# Patient Record
Sex: MALE | Race: ASIAN | NOT HISPANIC OR LATINO
[De-identification: names, ages, dates, MRNs, and addresses within clinical notes are randomized per-mention and may not be internally consistent; named-entity substitution may affect disease eponyms.]

---

## 2022-09-01 ENCOUNTER — APPOINTMENT (OUTPATIENT)
Dept: PLASTIC SURGERY | Facility: CLINIC | Age: 27
End: 2022-09-01

## 2022-09-23 ENCOUNTER — APPOINTMENT (OUTPATIENT)
Dept: PLASTIC SURGERY | Facility: CLINIC | Age: 27
End: 2022-09-23

## 2022-09-23 ENCOUNTER — TRANSCRIPTION ENCOUNTER (OUTPATIENT)
Age: 27
End: 2022-09-23

## 2022-09-23 DIAGNOSIS — Z86.59 PERSONAL HISTORY OF OTHER MENTAL AND BEHAVIORAL DISORDERS: ICD-10-CM

## 2022-09-23 PROCEDURE — 99204 OFFICE O/P NEW MOD 45 MIN: CPT

## 2022-09-23 RX ORDER — BUPROPION HYDROCHLORIDE 100 MG/1
TABLET, FILM COATED ORAL
Refills: 0 | Status: ACTIVE | COMMUNITY

## 2022-09-23 RX ORDER — ESTRADIOL 10 UG/1
TABLET, FILM COATED VAGINAL
Refills: 0 | Status: ACTIVE | COMMUNITY

## 2022-09-23 RX ORDER — SPIRONOLACTONE 50 MG/1
TABLET ORAL
Refills: 0 | Status: ACTIVE | COMMUNITY

## 2022-09-23 NOTE — ASSESSMENT
[FreeTextEntry1] : Pt was seen and examined together by LIS Cornelius and Dr. Kamron Tavera. Assessment and plan formulated and discussed at time of visit.

## 2022-09-23 NOTE — HISTORY OF PRESENT ILLNESS
[FreeTextEntry1] : JOHN AGUIRRE is a 27 year old male to female transgender patient with a history of gender dysphoria. She seeks consultation for facial feminization surgery and breast augmentation. She reports that she has been socially transitioning since June of 2020 . She has been medically transitioning since March of 2021. She takes estradiol orally and spironolactone. Her past medical history includes depression and anxiety for which she is managed on Wellbutrin.. She denies past surgical history. She is currently in transgender care at [facility]. She reports tobacco and THC use daily and alcohol occasionally. Denies other illicit drugs. Patient denies history of previous gender affirming surgeries and gender affirming procedures such as Botox, silicone, fillers, liposuction and fat grafting. Patient denies personal and family medical history of clots, strokes, bleeding disorders and anesthesia problems. She reports that the male appearance of herface/chest exacerbate her gender dysphoria and cause misgendering.

## 2022-10-11 ENCOUNTER — APPOINTMENT (OUTPATIENT)
Dept: PLASTIC SURGERY | Facility: CLINIC | Age: 27
End: 2022-10-11
Payer: COMMERCIAL

## 2022-10-11 PROCEDURE — XXXXX: CPT | Mod: 1L

## 2022-10-11 NOTE — HISTORY OF PRESENT ILLNESS
[FreeTextEntry1] : 27-year-old male to female transgender patient with a history of dysphoria to discuss upcoming facial feminization surgery and breast augmentation.  Patient has been socially transitioning since June 2020 and medically transitioning since March 2021.

## 2022-10-14 ENCOUNTER — APPOINTMENT (OUTPATIENT)
Dept: PLASTIC SURGERY | Facility: CLINIC | Age: 27
End: 2022-10-14

## 2022-12-02 ENCOUNTER — OUTPATIENT (OUTPATIENT)
Dept: OUTPATIENT SERVICES | Facility: HOSPITAL | Age: 27
LOS: 1 days | End: 2022-12-02

## 2022-12-02 ENCOUNTER — RESULT REVIEW (OUTPATIENT)
Age: 27
End: 2022-12-02

## 2022-12-02 ENCOUNTER — APPOINTMENT (OUTPATIENT)
Dept: CT IMAGING | Facility: CLINIC | Age: 27
End: 2022-12-02

## 2022-12-02 PROCEDURE — 70486 CT MAXILLOFACIAL W/O DYE: CPT | Mod: 26

## 2023-05-09 ENCOUNTER — TRANSCRIPTION ENCOUNTER (OUTPATIENT)
Age: 28
End: 2023-05-09

## 2023-05-09 VITALS
TEMPERATURE: 98 F | WEIGHT: 221.12 LBS | SYSTOLIC BLOOD PRESSURE: 122 MMHG | OXYGEN SATURATION: 96 % | RESPIRATION RATE: 61 BRPM | DIASTOLIC BLOOD PRESSURE: 69 MMHG | HEIGHT: 69 IN | HEART RATE: 73 BPM

## 2023-05-09 NOTE — PATIENT PROFILE ADULT - FALL HARM RISK - UNIVERSAL INTERVENTIONS
Bed in lowest position, wheels locked, appropriate side rails in place/Call bell, personal items and telephone in reach/Instruct patient to call for assistance before getting out of bed or chair/Non-slip footwear when patient is out of bed/[place] to call system/Physically safe environment - no spills, clutter or unnecessary equipment/Purposeful Proactive Rounding/Room/bathroom lighting operational, light cord in reach

## 2023-05-10 ENCOUNTER — INPATIENT (INPATIENT)
Facility: HOSPITAL | Age: 28
LOS: 0 days | Discharge: HOME | DRG: 876 | End: 2023-05-11
Attending: SURGERY | Admitting: SURGERY
Payer: MEDICAID

## 2023-05-10 ENCOUNTER — APPOINTMENT (OUTPATIENT)
Dept: PLASTIC SURGERY | Facility: HOSPITAL | Age: 28
End: 2023-05-10
Payer: COMMERCIAL

## 2023-05-10 DIAGNOSIS — Z98.890 OTHER SPECIFIED POSTPROCEDURAL STATES: Chronic | ICD-10-CM

## 2023-05-10 PROCEDURE — XXXXX: CPT | Mod: 1L

## 2023-05-10 PROCEDURE — 67950 REVISION OF EYELID: CPT

## 2023-05-10 PROCEDURE — 14040 TIS TRNFR F/C/C/M/N/A/G/H/F: CPT

## 2023-05-10 PROCEDURE — 15876 SUCTION LIPECTOMY HEAD&NECK: CPT

## 2023-05-10 DEVICE — IMP CUSTOM IPS TI 67MM: Type: IMPLANTABLE DEVICE | Status: FUNCTIONAL

## 2023-05-10 DEVICE — GUIDE CUTTING TRANSFORM XS: Type: IMPLANTABLE DEVICE | Status: FUNCTIONAL

## 2023-05-10 DEVICE — GUIDE MARKING CRANIAL: Type: IMPLANTABLE DEVICE | Status: FUNCTIONAL

## 2023-05-10 DEVICE — GUIDE CUTTING W/PLAN TRANSFORM: Type: IMPLANTABLE DEVICE | Status: FUNCTIONAL

## 2023-05-10 RX ORDER — DIAZEPAM 5 MG
5 TABLET ORAL EVERY 6 HOURS
Refills: 0 | Status: DISCONTINUED | OUTPATIENT
Start: 2023-05-10 | End: 2023-05-11

## 2023-05-10 RX ORDER — SODIUM CHLORIDE 9 MG/ML
1000 INJECTION, SOLUTION INTRAVENOUS
Refills: 0 | Status: DISCONTINUED | OUTPATIENT
Start: 2023-05-10 | End: 2023-05-11

## 2023-05-10 RX ORDER — ZOLPIDEM TARTRATE 10 MG/1
5 TABLET ORAL AT BEDTIME
Refills: 0 | Status: DISCONTINUED | OUTPATIENT
Start: 2023-05-10 | End: 2023-05-11

## 2023-05-10 RX ORDER — ACETAMINOPHEN 500 MG
1000 TABLET ORAL ONCE
Refills: 0 | Status: COMPLETED | OUTPATIENT
Start: 2023-05-10 | End: 2023-05-10

## 2023-05-10 RX ORDER — HYDROMORPHONE HYDROCHLORIDE 2 MG/ML
0.5 INJECTION INTRAMUSCULAR; INTRAVENOUS; SUBCUTANEOUS
Refills: 0 | Status: DISCONTINUED | OUTPATIENT
Start: 2023-05-10 | End: 2023-05-11

## 2023-05-10 RX ORDER — ONDANSETRON 8 MG/1
4 TABLET, FILM COATED ORAL EVERY 6 HOURS
Refills: 0 | Status: DISCONTINUED | OUTPATIENT
Start: 2023-05-10 | End: 2023-05-11

## 2023-05-10 RX ORDER — OXYCODONE HYDROCHLORIDE 5 MG/1
5 TABLET ORAL EVERY 4 HOURS
Refills: 0 | Status: DISCONTINUED | OUTPATIENT
Start: 2023-05-10 | End: 2023-05-11

## 2023-05-10 RX ORDER — CHLORHEXIDINE GLUCONATE 213 G/1000ML
15 SOLUTION TOPICAL
Refills: 0 | Status: DISCONTINUED | OUTPATIENT
Start: 2023-05-10 | End: 2023-05-11

## 2023-05-10 RX ORDER — DEXAMETHASONE 0.5 MG/5ML
10 ELIXIR ORAL EVERY 12 HOURS
Refills: 0 | Status: DISCONTINUED | OUTPATIENT
Start: 2023-05-10 | End: 2023-05-11

## 2023-05-10 RX ORDER — CEFAZOLIN SODIUM 1 G
2000 VIAL (EA) INJECTION EVERY 8 HOURS
Refills: 0 | Status: DISCONTINUED | OUTPATIENT
Start: 2023-05-10 | End: 2023-05-11

## 2023-05-10 RX ORDER — BENZOCAINE AND MENTHOL 5; 1 G/100ML; G/100ML
1 LIQUID ORAL EVERY 6 HOURS
Refills: 0 | Status: DISCONTINUED | OUTPATIENT
Start: 2023-05-10 | End: 2023-05-11

## 2023-05-10 RX ORDER — OXYCODONE HYDROCHLORIDE 5 MG/1
10 TABLET ORAL EVERY 4 HOURS
Refills: 0 | Status: DISCONTINUED | OUTPATIENT
Start: 2023-05-10 | End: 2023-05-11

## 2023-05-10 RX ORDER — SPIRONOLACTONE 25 MG/1
100 TABLET, FILM COATED ORAL
Refills: 0 | Status: DISCONTINUED | OUTPATIENT
Start: 2023-05-11 | End: 2023-05-11

## 2023-05-10 RX ORDER — ACETAMINOPHEN 500 MG
975 TABLET ORAL EVERY 6 HOURS
Refills: 0 | Status: DISCONTINUED | OUTPATIENT
Start: 2023-05-10 | End: 2023-05-11

## 2023-05-10 RX ORDER — CALCIUM CARBONATE 500(1250)
1 TABLET ORAL EVERY 6 HOURS
Refills: 0 | Status: DISCONTINUED | OUTPATIENT
Start: 2023-05-10 | End: 2023-05-11

## 2023-05-10 RX ADMIN — CHLORHEXIDINE GLUCONATE 15 MILLILITER(S): 213 SOLUTION TOPICAL at 19:04

## 2023-05-10 RX ADMIN — Medication 100 MILLIGRAM(S): at 14:16

## 2023-05-10 RX ADMIN — BENZOCAINE AND MENTHOL 1 LOZENGE: 5; 1 LIQUID ORAL at 18:39

## 2023-05-10 RX ADMIN — Medication 100 MILLIGRAM(S): at 21:37

## 2023-05-10 RX ADMIN — Medication 400 MILLIGRAM(S): at 19:39

## 2023-05-10 RX ADMIN — HYDROMORPHONE HYDROCHLORIDE 0.5 MILLIGRAM(S): 2 INJECTION INTRAMUSCULAR; INTRAVENOUS; SUBCUTANEOUS at 13:36

## 2023-05-10 RX ADMIN — SODIUM CHLORIDE 75 MILLILITER(S): 9 INJECTION, SOLUTION INTRAVENOUS at 13:26

## 2023-05-10 RX ADMIN — Medication 102 MILLIGRAM(S): at 18:39

## 2023-05-10 NOTE — BRIEF OPERATIVE NOTE - OPERATION/FINDINGS
FFS ( frontal sinus setback, lateral brow contouring, canthopexy, fat grafting to lip and cheeks, eyebrow raise, genioplasty, mandibular angles)

## 2023-05-10 NOTE — BRIEF OPERATIVE NOTE - NSICDXBRIEFPROCEDURE_GEN_ALL_CORE_FT
PROCEDURES:  Contouring, forehead 10-May-2023 11:50:32  Chepe Sanchez  Lateral canthopexy 10-May-2023 11:50:41  Chepe Sanchez  Open mandibular angle osteotomy 10-May-2023 11:50:48  Chepe Sanchez  Genioplasty 10-May-2023 11:50:54  Chepe Sanchez  Complete rhinoplasty 10-May-2023 11:51:04  Chepe Sanchez

## 2023-05-11 ENCOUNTER — TRANSCRIPTION ENCOUNTER (OUTPATIENT)
Age: 28
End: 2023-05-11

## 2023-05-11 VITALS
OXYGEN SATURATION: 97 % | SYSTOLIC BLOOD PRESSURE: 136 MMHG | RESPIRATION RATE: 16 BRPM | TEMPERATURE: 98 F | HEART RATE: 73 BPM | DIASTOLIC BLOOD PRESSURE: 80 MMHG

## 2023-05-11 PROCEDURE — C9399: CPT

## 2023-05-11 PROCEDURE — C1889: CPT

## 2023-05-11 RX ADMIN — CHLORHEXIDINE GLUCONATE 15 MILLILITER(S): 213 SOLUTION TOPICAL at 05:44

## 2023-05-11 RX ADMIN — Medication 100 MILLIGRAM(S): at 06:26

## 2023-05-11 RX ADMIN — Medication 975 MILLIGRAM(S): at 05:43

## 2023-05-11 RX ADMIN — Medication 102 MILLIGRAM(S): at 05:43

## 2023-05-11 RX ADMIN — SPIRONOLACTONE 100 MILLIGRAM(S): 25 TABLET, FILM COATED ORAL at 05:44

## 2023-05-11 RX ADMIN — Medication 975 MILLIGRAM(S): at 06:43

## 2023-05-11 NOTE — PROGRESS NOTE ADULT - SUBJECTIVE AND OBJECTIVE BOX
Plastic Surgery    SUBJECTIVE: Pt seen and examined on rounds with team. No acute events overnight. Pt doing well this AM and mazin CLD.      VITALS  T(C): 36.6 (05-11-23 @ 05:18), Max: 37.3 (05-10-23 @ 11:35)  HR: 67 (05-11-23 @ 05:18) (64 - 72)  BP: 132/79 (05-11-23 @ 05:18) (99/54 - 132/79)  RR: 18 (05-11-23 @ 05:18) (7 - 18)  SpO2: 95% (05-11-23 @ 05:18) (93% - 96%)  CAPILLARY BLOOD GLUCOSE          Is/Os    05-10 @ 07:01  -  05-11 @ 07:00  --------------------------------------------------------  IN:    IV PiggyBack: 50 mL    Lactated Ringers: 1125 mL    Oral Fluid: 480 mL  Total IN: 1655 mL    OUT:    Bulb (mL): 22.5 mL    Voided (mL): 3100 mL  Total OUT: 3122.5 mL    Total NET: -1467.5 mL          PHYSICAL EXAM:   General: NAD, sitting up in bed in bed   Neuro: Awake and alert  HEENT: EFRAIN drain intact with ss output, Surgical dressings intact with slight ss strikethrough noted.       MEDICATIONS (STANDING): acetaminophen     Tablet .. 975 milliGRAM(s) Oral every 6 hours  ceFAZolin   IVPB 2000 milliGRAM(s) IV Intermittent every 8 hours  dexAMETHasone  IVPB 10 milliGRAM(s) IV Intermittent every 12 hours  lactated ringers. 1000 milliLiter(s) IV Continuous <Continuous>  spironolactone 100 milliGRAM(s) Oral two times a day    MEDICATIONS (PRN):calcium carbonate    500 mG (Tums) Chewable 1 Tablet(s) Chew every 6 hours PRN Dyspepsia  diazepam    Tablet 5 milliGRAM(s) Oral every 6 hours PRN anxiety  HYDROmorphone  Injectable 0.5 milliGRAM(s) IV Push every 15 minutes PRN Severe Pain (7 - 10)  ondansetron Injectable 4 milliGRAM(s) IV Push every 6 hours PRN Nausea and/or Vomiting  oxyCODONE    IR 10 milliGRAM(s) Oral every 4 hours PRN Severe Pain (7 - 10)  oxyCODONE    IR 5 milliGRAM(s) Oral every 4 hours PRN Moderate Pain (4 - 6)  zolpidem 5 milliGRAM(s) Oral at bedtime PRN Insomnia

## 2023-05-11 NOTE — DISCHARGE NOTE PROVIDER - NSDCMRMEDTOKEN_GEN_ALL_CORE_FT
buPROPion 100 mg oral tablet: 1 orally 2 times a day  spironolactone 100 mg oral tablet: 1 orally 2 times a day  Vitamin D3 25 mcg (1000 intl units) oral tablet: 1 orally once a day

## 2023-05-11 NOTE — DISCHARGE NOTE PROVIDER - CARE PROVIDER_API CALL
Kamron Tavera)  Plastic Surgery  [address], [place] [address]  Phone: [phone]  Fax: [phone]  Established Patient  Follow Up Time: 1 week

## 2023-05-11 NOTE — DISCHARGE NOTE PROVIDER - NSDCFUADDINST_GEN_ALL_CORE_FT
Medications:  •A prescription for pain medication will be e-prescribed to your pharmacy.  Please take this as needed for severe pain.  •Do not drive while taking the prescribed pain medication.  •Do not take pain medication on an empty stomach, this may make you nauseous.  •Constipation is not uncommon when taking prescription pain medication.  You may take an OTC stool softener like Dulcolax or Sennakot to help with this.  •You may take over the counter pain medication such as Tylenol (acetaminophen) or Advil (ibuprofen) for pain.  •Please use Peridex rinse- swish and spit twice daily for 7 days.  Activity:  •No bending or heavy lifting.  Keep your head above the level of your heart.  At your first post-op visit we will assess how you are doing and will adjust the restrictions as needed.  •Sleep elevated on at least 2 pillows  Diet:  •Intraoral incisions:  Clear liquid diet for first 24 hours then may advance to soft diet.  Avoid overly hot, cold, spicy, or acidic foods.  •Do not drink alcohol in the immediate postoperative period and while taking prescription pain medication.  Wound Care:  •Gently brush teeth and keep mouth clean after eating.  At your first post-op visit we will assess the wound and instruct you of any care needed.  •You may shower.  Let soap and water run over the incisions and pat dry.  •You may gently apply ice pack, or frozen peas to the nose and eye regions.  Apply this for the first 48-72 hours.  20 minutes on, 20 minutes off.  •If you were given a facial/chin garment, please wear this 24/7 until your first postop visit

## 2023-05-11 NOTE — PROGRESS NOTE ADULT - ASSESSMENT
27 y/o female s/p facial feminization surgery with forehead, lateral brow, genioplasty, mandibular angles, rhinoplasty, fat grafting to face 5/10.     Plan:   -DC IVF   -Cont CLD  -OOB  -Elevate HOB  -EFRAIN drain and surgical dressings removed, new jaw bra placed  -Pt educated on how to use jaw bra

## 2023-05-11 NOTE — DISCHARGE NOTE NURSING/CASE MANAGEMENT/SOCIAL WORK - PATIENT PORTAL LINK FT
You can access the FollowMyHealth Patient Portal offered by [facility] by registering at the following website: [email or web address]. By joining [facility]’s FollowMyHealth portal, you will also be able to view your health information using other applications (apps) compatible with our system.

## 2023-05-11 NOTE — DISCHARGE NOTE PROVIDER - HOSPITAL COURSE
Pt is a 28 year old female, assigned male at birth.  She underwent facial feminization surgery with Dr. Tavera including forehead, lateral brow, genioplasty, mandibular angles, rhinoplasty, fat grafting to face.  She tolerated the procedure well and recovered without issue in the recovery room.  POD1, she tolerated clear liquid diet, ambulated, the dressings were removed and replaced, the drain was removed, and she was cleared for discharge.    Your post operative medications were sent to [facility]:   -Augmentin 500-125mg Q12 for 5 days   -Chlorohexidine Gluconate 0.12% mouth/throat solution   -Methylprednisolone 4mg oral tablet therapy pack (Medrol) take as directed for 7 days   -Oxycodone- acetaminophen 5-325mg Q6 x 7 days

## 2023-05-17 ENCOUNTER — APPOINTMENT (OUTPATIENT)
Dept: PLASTIC SURGERY | Facility: CLINIC | Age: 28
End: 2023-05-17

## 2023-05-17 DIAGNOSIS — F64.9 GENDER IDENTITY DISORDER, UNSPECIFIED: ICD-10-CM

## 2023-05-17 DIAGNOSIS — Z79.899 OTHER LONG TERM (CURRENT) DRUG THERAPY: ICD-10-CM

## 2023-05-17 NOTE — HISTORY OF PRESENT ILLNESS
[FreeTextEntry1] : DOS 5/10/2023 Facial feminization with,\par  1.  Forehead reduction and advancement. CPT 56193\par 2.  Supraorbital rim reduction medially.  CPT 83394\par 3.  Frontal sinus setback.CPT 34220\par 4.  Lateral orbital rim reduction and vertical osteotomy. CPT 98290\par 5.  Bilateral brow lift. CPT 66065\par 6.  Fat grafting to the zygoma CPT 46942\par 7.  Open genioplasty with reduction. CPT 82949\par 8.  Open rhinoplasty. CPT 02306\par 9, Mandible Angle Reduction 82157-22

## 2023-05-18 PROBLEM — Z86.59 PERSONAL HISTORY OF OTHER MENTAL AND BEHAVIORAL DISORDERS: Chronic | Status: ACTIVE | Noted: 2023-05-09

## 2023-05-24 LAB — SARS-COV-2 N GENE NPH QL NAA+PROBE: NOT DETECTED

## 2023-06-09 ENCOUNTER — APPOINTMENT (OUTPATIENT)
Dept: PLASTIC SURGERY | Facility: CLINIC | Age: 28
End: 2023-06-09
Payer: COMMERCIAL

## 2023-06-09 DIAGNOSIS — Z00.00 ENCOUNTER FOR GENERAL ADULT MEDICAL EXAMINATION W/OUT ABNORMAL FINDINGS: ICD-10-CM

## 2023-06-09 PROCEDURE — 99024 POSTOP FOLLOW-UP VISIT: CPT

## 2023-06-09 NOTE — HISTORY OF PRESENT ILLNESS
[FreeTextEntry1] : 27-year-old male to female transgender patient \par S/P FFS including Forehead reduction and advancement. CPT 09972\par 2.  Supraorbital rim reduction medially.  CPT 38773\par 3.  Frontal sinus setback.CPT 21139\par 4.  Lateral orbital rim reduction and vertical osteotomy. CPT 19941\par 5.  Bilateral brow lift. CPT 87404\par 6.  Fat grafting to the zygoma CPT 55788\par 7.  Open genioplasty with reduction. CPT 21122\par 8.  Open rhinoplasty. CPT 07742\par 9, Mandible Angle Reduction \par \par Pt admitted to [facility] post op\par unremarkable post op course

## 2023-08-11 ENCOUNTER — APPOINTMENT (OUTPATIENT)
Dept: PLASTIC SURGERY | Facility: CLINIC | Age: 28
End: 2023-08-11
Payer: COMMERCIAL

## 2023-08-11 PROCEDURE — 99214 OFFICE O/P EST MOD 30 MIN: CPT

## 2023-08-11 NOTE — HISTORY OF PRESENT ILLNESS
[FreeTextEntry1] : DOS 5/10/2023 Facial feminization with,  1.  Forehead reduction and advancement. CPT 98887 2.  Supraorbital rim reduction medially.  CPT 29657 3.  Frontal sinus setback.CPT 11407 4.  Lateral orbital rim reduction and vertical osteotomy. CPT 41968 5.  Bilateral brow lift. CPT 18082 6.  Fat grafting to the zygoma CPT 24093 7.  Open genioplasty with reduction. CPT 41950 8.  Open rhinoplasty. CPT 00178 9, Mandible Angle Reduction 17638-87  Pt would like to discuss breast augmentation, revision rhinoplasty, and lip augmentation. She seeks consultation for breast augmentation surgery. She reports that she has been socially transitioning for about 3 years now. She has been on feminizing hormones for approximately 3 years.  She reports significant mental health history.   She states she is still an B cup and she is hoping to be approximately a S. She denies any residual breast pain. She denies any lumps, bumps, or other recent changes in her breasts. She has not had any breast imaging. She denies any family history of breast cancer or blood clots. She denies continued use, drinks some alcohol, and occasionally smokes marijuana. She expresses understanding that she will need to abstain from smoking for 6 weeks before and 6 weeks after surgery.  Patient denies personal and family medical history of clots, strokes, bleeding disorders and anesthesia problems. She denies history of dense or cystic breast tissue. No family history of breast cancer. She reports that the male appearance of her chest exacerbate her gender dysphoria and cause misgendering.

## 2023-11-07 ENCOUNTER — TRANSCRIPTION ENCOUNTER (OUTPATIENT)
Age: 28
End: 2023-11-07

## 2023-11-07 LAB
ALBUMIN SERPL ELPH-MCNC: 4.7 G/DL
ALP BLD-CCNC: 42 U/L
ALT SERPL-CCNC: 10 U/L
ANION GAP SERPL CALC-SCNC: 9 MMOL/L
AST SERPL-CCNC: 9 U/L
BILIRUB SERPL-MCNC: 0.3 MG/DL
BUN SERPL-MCNC: 17 MG/DL
CALCIUM SERPL-MCNC: 9.2 MG/DL
CHLORIDE SERPL-SCNC: 106 MMOL/L
CO2 SERPL-SCNC: 25 MMOL/L
CREAT SERPL-MCNC: 1.07 MG/DL
EGFR: 98 ML/MIN/1.73M2
GLUCOSE SERPL-MCNC: 110 MG/DL
HCT VFR BLD CALC: 43.7 %
HGB BLD-MCNC: 14.4 G/DL
MCHC RBC-ENTMCNC: 30.8 PG
MCHC RBC-ENTMCNC: 33 GM/DL
MCV RBC AUTO: 93.4 FL
PLATELET # BLD AUTO: 237 K/UL
POTASSIUM SERPL-SCNC: 4.6 MMOL/L
PROT SERPL-MCNC: 7 G/DL
RBC # BLD: 4.68 M/UL
RBC # FLD: 13.1 %
SODIUM SERPL-SCNC: 140 MMOL/L
WBC # FLD AUTO: 9 K/UL

## 2023-11-07 NOTE — ASU PATIENT PROFILE, ADULT - NSICDXPASTSURGICALHX_GEN_ALL_CORE_FT
PAST SURGICAL HISTORY:  History of surgery body contouring     PAST SURGICAL HISTORY:  History of facial surgery     History of surgery body contouring

## 2023-11-07 NOTE — ASU PATIENT PROFILE, ADULT - PRO MENTAL HEALTH SX RECENT

## 2023-11-08 ENCOUNTER — OUTPATIENT (OUTPATIENT)
Dept: OUTPATIENT SERVICES | Facility: HOSPITAL | Age: 28
LOS: 1 days | Discharge: HOME | End: 2023-11-08

## 2023-11-08 ENCOUNTER — APPOINTMENT (OUTPATIENT)
Dept: PLASTIC SURGERY | Facility: AMBULATORY SURGERY CENTER | Age: 28
End: 2023-11-08
Payer: COMMERCIAL

## 2023-11-08 ENCOUNTER — TRANSCRIPTION ENCOUNTER (OUTPATIENT)
Age: 28
End: 2023-11-08

## 2023-11-08 VITALS
HEART RATE: 62 BPM | RESPIRATION RATE: 15 BRPM | DIASTOLIC BLOOD PRESSURE: 83 MMHG | SYSTOLIC BLOOD PRESSURE: 125 MMHG | HEIGHT: 69 IN | WEIGHT: 200.62 LBS | TEMPERATURE: 98 F | OXYGEN SATURATION: 96 %

## 2023-11-08 VITALS
SYSTOLIC BLOOD PRESSURE: 130 MMHG | RESPIRATION RATE: 16 BRPM | OXYGEN SATURATION: 100 % | HEART RATE: 61 BPM | TEMPERATURE: 99 F | DIASTOLIC BLOOD PRESSURE: 77 MMHG

## 2023-11-08 DIAGNOSIS — Z98.890 OTHER SPECIFIED POSTPROCEDURAL STATES: Chronic | ICD-10-CM

## 2023-11-08 PROCEDURE — 19325 BREAST AUGMENTATION W/IMPLT: CPT | Mod: 50

## 2023-11-08 PROCEDURE — 15773 GRFG AUTOL FAT LIPO 25 CC/<: CPT

## 2023-11-08 PROCEDURE — 30410 RECONSTRUCTION OF NOSE: CPT

## 2023-11-08 DEVICE — IMPLANTABLE DEVICE: Type: IMPLANTABLE DEVICE | Site: BILATERAL | Status: FUNCTIONAL

## 2023-11-08 RX ORDER — OXYCODONE HYDROCHLORIDE 5 MG/1
5 TABLET ORAL ONCE
Refills: 0 | Status: DISCONTINUED | OUTPATIENT
Start: 2023-11-08 | End: 2023-11-08

## 2023-11-08 RX ORDER — CHOLECALCIFEROL (VITAMIN D3) 125 MCG
1 CAPSULE ORAL
Refills: 0 | DISCHARGE

## 2023-11-08 RX ORDER — BUPROPION HYDROCHLORIDE 150 MG/1
1 TABLET, EXTENDED RELEASE ORAL
Refills: 0 | DISCHARGE

## 2023-11-08 RX ORDER — ONDANSETRON 8 MG/1
4 TABLET, FILM COATED ORAL ONCE
Refills: 0 | Status: DISCONTINUED | OUTPATIENT
Start: 2023-11-08 | End: 2023-11-08

## 2023-11-08 RX ORDER — SODIUM CHLORIDE 9 MG/ML
1000 INJECTION, SOLUTION INTRAVENOUS
Refills: 0 | Status: DISCONTINUED | OUTPATIENT
Start: 2023-11-08 | End: 2023-11-08

## 2023-11-08 RX ORDER — SPIRONOLACTONE 25 MG/1
1 TABLET, FILM COATED ORAL
Refills: 0 | DISCHARGE

## 2023-11-08 RX ORDER — FENTANYL CITRATE 50 UG/ML
25 INJECTION INTRAVENOUS
Refills: 0 | Status: DISCONTINUED | OUTPATIENT
Start: 2023-11-08 | End: 2023-11-08

## 2023-11-08 RX ORDER — HYDROMORPHONE HYDROCHLORIDE 2 MG/ML
0.5 INJECTION INTRAMUSCULAR; INTRAVENOUS; SUBCUTANEOUS ONCE
Refills: 0 | Status: DISCONTINUED | OUTPATIENT
Start: 2023-11-08 | End: 2023-11-08

## 2023-11-08 RX ADMIN — OXYCODONE HYDROCHLORIDE 5 MILLIGRAM(S): 5 TABLET ORAL at 16:20

## 2023-11-08 RX ADMIN — OXYCODONE HYDROCHLORIDE 5 MILLIGRAM(S): 5 TABLET ORAL at 17:00

## 2023-11-08 NOTE — H&P PST ADULT - HISTORY OF PRESENT ILLNESS
28y F with history of facial feminizing surgery now presents for breast augmentation, lip augmentation, and nose revision.

## 2023-11-08 NOTE — BRIEF OPERATIVE NOTE - NSICDXBRIEFPROCEDURE_GEN_ALL_CORE_FT
PROCEDURES:  Bilateral breast augmentation 08-Nov-2023 12:37:56  Wai Bermudez  Minor revision, rhinoplasty 08-Nov-2023 12:38:06  Wai Bermudez  Augmentation of lip 08-Nov-2023 14:59:05  Wai Bermudez

## 2023-11-08 NOTE — ASU DISCHARGE PLAN (ADULT/PEDIATRIC) - CARE PROVIDER_API CALL
Kamron Tavera  Plastic Surgery  [address], [address]  [place] [address]  Phone: [phone]  Fax: [phone]  Follow Up Time:

## 2023-11-08 NOTE — H&P PST ADULT - NSICDXPROCEDURE_GEN_ALL_CORE_FT
PROCEDURES:  Bilateral breast augmentation 08-Nov-2023 12:37:56  Wai Bermudez  Minor revision, rhinoplasty 08-Nov-2023 12:38:06  Wai Bermudez

## 2023-11-08 NOTE — BRIEF OPERATIVE NOTE - OPERATION/FINDINGS
bilateral breast implant augmentation, upper and lower lip augmentation with Galeal fascia, augmentation of nasal radix with fascia.

## 2023-11-08 NOTE — ASU DISCHARGE PLAN (ADULT/PEDIATRIC) - ASU DC SPECIAL INSTRUCTIONSFT
NOTIFY YOUR SURGEON IF YOU HAVE: any bleeding that does not stop, any pus draining from your wound(s), any fever (over 100.4 F) persistent nausea/vomiting, or if your pain is not controlled on your discharge pain medications, unable to urinate.    ACTIVITY: Please refrain from increased physical activity for a period of 2 weeks. Please do not lift anything heavier than a gallon of milk or about 5 pounds. Upon follow up you will be given further instructions on how much physical activity you may do.     ANTIBIOTICS: Please take any prescribed antibiotics as directed. These will be sent to your designated pharmacy    Dressing: please leave dressing in place until follow up. Please keep the dressing clean and dry while you recover.     PAIN: please take prescribed pain medication as directed.     Please follow up with Dr. Tavera within x1 week after discharge from the hospital. You may call [phone] to schedule an appointment.

## 2023-11-15 ENCOUNTER — RESULT REVIEW (OUTPATIENT)
Age: 28
End: 2023-11-15

## 2023-11-15 ENCOUNTER — APPOINTMENT (OUTPATIENT)
Dept: PLASTIC SURGERY | Facility: CLINIC | Age: 28
End: 2023-11-15
Payer: COMMERCIAL

## 2023-11-15 PROCEDURE — 99024 POSTOP FOLLOW-UP VISIT: CPT

## 2023-11-16 ENCOUNTER — OUTPATIENT (OUTPATIENT)
Dept: OUTPATIENT SERVICES | Facility: HOSPITAL | Age: 28
LOS: 1 days | End: 2023-11-16

## 2023-11-16 ENCOUNTER — RESULT REVIEW (OUTPATIENT)
Age: 28
End: 2023-11-16

## 2023-11-16 ENCOUNTER — APPOINTMENT (OUTPATIENT)
Dept: ULTRASOUND IMAGING | Facility: CLINIC | Age: 28
End: 2023-11-16
Payer: COMMERCIAL

## 2023-11-16 DIAGNOSIS — Z98.890 OTHER SPECIFIED POSTPROCEDURAL STATES: Chronic | ICD-10-CM

## 2023-11-16 PROCEDURE — 76641 ULTRASOUND BREAST COMPLETE: CPT | Mod: 26,LT

## 2023-12-01 ENCOUNTER — APPOINTMENT (OUTPATIENT)
Dept: PLASTIC SURGERY | Facility: CLINIC | Age: 28
End: 2023-12-01
Payer: COMMERCIAL

## 2023-12-01 PROCEDURE — 99024 POSTOP FOLLOW-UP VISIT: CPT

## 2024-01-10 ENCOUNTER — APPOINTMENT (OUTPATIENT)
Dept: PLASTIC SURGERY | Facility: CLINIC | Age: 29
End: 2024-01-10
Payer: COMMERCIAL

## 2024-01-10 PROCEDURE — 99024 POSTOP FOLLOW-UP VISIT: CPT

## 2024-01-10 NOTE — HISTORY OF PRESENT ILLNESS
[FreeTextEntry1] : JOHN AGUIRRE is a 28 year old patient here s/p surgery on11/8/23 with   1.  Bilateral breast augmentation  with 505 mL silicone implants, Sientra moderate profile plus 2.  Fascial graft to the lips. 3.  Revision rhinoplasty.

## 2024-03-15 ENCOUNTER — APPOINTMENT (OUTPATIENT)
Dept: PLASTIC SURGERY | Facility: CLINIC | Age: 29
End: 2024-03-15
Payer: COMMERCIAL

## 2024-03-15 PROCEDURE — 99213 OFFICE O/P EST LOW 20 MIN: CPT

## 2024-03-15 NOTE — HISTORY OF PRESENT ILLNESS
[FreeTextEntry1] : JOHN AGUIRRE is a 28 year old patient here s/p surgery on11/8/23 with   1.  Bilateral breast augmentation  with 505 mL silicone implants, Sientra moderate profile plus 2.  Fascial graft to the lips. 3.  Revision rhinoplasty.  She is still having pain at the left breast site. She reports that the implant still feels like it is out of the pocket and applies pressure on the left ribcage.     denies pain/discomfort (Rating = 0)

## 2024-05-06 RX ORDER — OXYCODONE AND ACETAMINOPHEN 5; 325 MG/1; MG/1
5-325 TABLET ORAL
Qty: 15 | Refills: 0 | Status: COMPLETED | COMMUNITY
Start: 2023-11-08 | End: 2024-05-06

## 2024-05-06 RX ORDER — PROGESTERONE 200 MG/1
CAPSULE ORAL
Refills: 0 | Status: ACTIVE | COMMUNITY

## 2024-05-06 RX ORDER — CHLORHEXIDINE GLUCONATE, 0.12% ORAL RINSE 1.2 MG/ML
0.12 SOLUTION DENTAL
Qty: 150 | Refills: 0 | Status: COMPLETED | COMMUNITY
Start: 2023-05-10 | End: 2024-05-06

## 2024-05-06 RX ORDER — METHYLPREDNISOLONE 4 MG/1
4 TABLET ORAL
Qty: 1 | Refills: 0 | Status: COMPLETED | COMMUNITY
Start: 2023-05-10 | End: 2024-05-06

## 2024-05-06 RX ORDER — CEPHALEXIN 250 MG/1
250 TABLET ORAL
Qty: 5 | Refills: 0 | Status: COMPLETED | COMMUNITY
Start: 2023-11-08 | End: 2024-05-06

## 2024-05-06 RX ORDER — OXYCODONE AND ACETAMINOPHEN 5; 325 MG/1; MG/1
5-325 TABLET ORAL
Qty: 15 | Refills: 0 | Status: COMPLETED | COMMUNITY
Start: 2023-05-10 | End: 2024-05-06

## 2024-05-06 RX ORDER — AMOXICILLIN AND CLAVULANATE POTASSIUM 500; 125 MG/1; MG/1
500-125 TABLET, FILM COATED ORAL
Qty: 10 | Refills: 0 | Status: COMPLETED | COMMUNITY
Start: 2023-05-10 | End: 2024-05-06

## 2024-05-21 ENCOUNTER — TRANSCRIPTION ENCOUNTER (OUTPATIENT)
Age: 29
End: 2024-05-21

## 2024-05-21 RX ORDER — PROGESTERONE 200 MG/1
2 CAPSULE, LIQUID FILLED ORAL
Refills: 0 | DISCHARGE

## 2024-05-21 RX ORDER — SPIRONOLACTONE 25 MG/1
1 TABLET, FILM COATED ORAL
Refills: 0 | DISCHARGE

## 2024-05-21 RX ORDER — BUPROPION HYDROCHLORIDE 150 MG/1
1 TABLET, EXTENDED RELEASE ORAL
Refills: 0 | DISCHARGE

## 2024-05-21 RX ORDER — CHLORHEXIDINE GLUCONATE 213 G/1000ML
1 SOLUTION TOPICAL DAILY
Refills: 0 | Status: DISCONTINUED | OUTPATIENT
Start: 2024-05-22 | End: 2024-05-22

## 2024-05-21 NOTE — ASU PATIENT PROFILE, ADULT - FALL HARM RISK - UNIVERSAL INTERVENTIONS
Bed in lowest position, wheels locked, appropriate side rails in place/Call bell, personal items and telephone in reach/Instruct patient to call for assistance before getting out of bed or chair/Non-slip footwear when patient is out of bed/[place] to call system/Physically safe environment - no spills, clutter or unnecessary equipment/Purposeful Proactive Rounding/Room/bathroom lighting operational, light cord in reach

## 2024-05-21 NOTE — ASU PATIENT PROFILE, ADULT - NS PREOP UNDERSTANDS INFO
30-Jul-2023 16:15 spoke to patient  to be  NPO after  12mn ,  dress comfortable, leave  all valuable at home,  Bring ID photo and insurance cards,  escort  arranged , address and telephone given to patient/yes

## 2024-05-21 NOTE — ASU PATIENT PROFILE, ADULT - NSICDXPASTSURGICALHX_GEN_ALL_CORE_FT
PAST SURGICAL HISTORY:  H/O rhinoplasty     History of augmentation of both breasts     History of facial surgery     History of surgery body contouring

## 2024-05-22 ENCOUNTER — TRANSCRIPTION ENCOUNTER (OUTPATIENT)
Age: 29
End: 2024-05-22

## 2024-05-22 ENCOUNTER — OUTPATIENT (OUTPATIENT)
Dept: OUTPATIENT SERVICES | Facility: HOSPITAL | Age: 29
LOS: 1 days | Discharge: HOME | End: 2024-05-22

## 2024-05-22 ENCOUNTER — APPOINTMENT (OUTPATIENT)
Dept: PLASTIC SURGERY | Facility: AMBULATORY SURGERY CENTER | Age: 29
End: 2024-05-22
Payer: COMMERCIAL

## 2024-05-22 VITALS
SYSTOLIC BLOOD PRESSURE: 115 MMHG | HEART RATE: 80 BPM | DIASTOLIC BLOOD PRESSURE: 67 MMHG | RESPIRATION RATE: 19 BRPM | OXYGEN SATURATION: 97 % | TEMPERATURE: 97 F

## 2024-05-22 VITALS
HEIGHT: 69 IN | SYSTOLIC BLOOD PRESSURE: 112 MMHG | OXYGEN SATURATION: 97 % | DIASTOLIC BLOOD PRESSURE: 80 MMHG | HEART RATE: 60 BPM | WEIGHT: 222.67 LBS | RESPIRATION RATE: 14 BRPM | TEMPERATURE: 98 F

## 2024-05-22 DIAGNOSIS — Z98.890 OTHER SPECIFIED POSTPROCEDURAL STATES: Chronic | ICD-10-CM

## 2024-05-22 PROCEDURE — 19380 REVJ RECONSTRUCTED BREAST: CPT | Mod: LT

## 2024-05-22 DEVICE — IMPLANTABLE DEVICE: Type: IMPLANTABLE DEVICE | Site: LEFT | Status: FUNCTIONAL

## 2024-05-22 RX ORDER — FENTANYL CITRATE 50 UG/ML
25 INJECTION INTRAVENOUS
Refills: 0 | Status: DISCONTINUED | OUTPATIENT
Start: 2024-05-22 | End: 2024-05-22

## 2024-05-22 RX ORDER — APREPITANT 80 MG/1
40 CAPSULE ORAL ONCE
Refills: 0 | Status: COMPLETED | OUTPATIENT
Start: 2024-05-22 | End: 2024-05-22

## 2024-05-22 RX ORDER — ACETAMINOPHEN 500 MG
1000 TABLET ORAL ONCE
Refills: 0 | Status: DISCONTINUED | OUTPATIENT
Start: 2024-05-22 | End: 2024-05-22

## 2024-05-22 RX ORDER — HYDROMORPHONE HYDROCHLORIDE 2 MG/ML
0.25 INJECTION INTRAMUSCULAR; INTRAVENOUS; SUBCUTANEOUS
Refills: 0 | Status: DISCONTINUED | OUTPATIENT
Start: 2024-05-22 | End: 2024-05-22

## 2024-05-22 RX ORDER — OXYCODONE HYDROCHLORIDE 5 MG/1
5 TABLET ORAL ONCE
Refills: 0 | Status: DISCONTINUED | OUTPATIENT
Start: 2024-05-22 | End: 2024-05-22

## 2024-05-22 RX ORDER — OXYCODONE HYDROCHLORIDE 5 MG/1
1 TABLET ORAL
Qty: 6 | Refills: 0
Start: 2024-05-22 | End: 2024-05-24

## 2024-05-22 RX ORDER — SODIUM CHLORIDE 9 MG/ML
1000 INJECTION, SOLUTION INTRAVENOUS
Refills: 0 | Status: DISCONTINUED | OUTPATIENT
Start: 2024-05-22 | End: 2024-05-22

## 2024-05-22 RX ORDER — ACETAMINOPHEN 500 MG
1000 TABLET ORAL ONCE
Refills: 0 | Status: COMPLETED | OUTPATIENT
Start: 2024-05-22 | End: 2024-05-22

## 2024-05-22 RX ORDER — CHOLECALCIFEROL (VITAMIN D3) 125 MCG
1 CAPSULE ORAL
Refills: 0 | DISCHARGE

## 2024-05-22 RX ADMIN — SODIUM CHLORIDE 100 MILLILITER(S): 9 INJECTION, SOLUTION INTRAVENOUS at 11:17

## 2024-05-22 RX ADMIN — CHLORHEXIDINE GLUCONATE 1 APPLICATION(S): 213 SOLUTION TOPICAL at 08:54

## 2024-05-22 RX ADMIN — Medication 1000 MILLIGRAM(S): at 09:00

## 2024-05-22 RX ADMIN — APREPITANT 40 MILLIGRAM(S): 80 CAPSULE ORAL at 09:00

## 2024-05-22 NOTE — ASU DISCHARGE PLAN (ADULT/PEDIATRIC) - ASU DC SPECIAL INSTRUCTIONSFT
PAIN CONTROL: Take over the counter medications as directed on bottle for pain. Alternating between Tylenol (acetaminophen) and Motrin (ibuprofen) every 3 hours may help with pain control. Take additional prescribed medications as needed, as directed.    DRESSINGS: You have a dressing called SteriStrips in place over your incision. Do not remove. These will fall off on their own.     SHOWER: Keep dressings on and dry.    ACTIVITY: No heavy lifting or straining. Otherwise, you may return to your usual level of physical activity. If you are taking narcotic pain medication (such as Percocet) DO NOT drive a car, operate machinery or make important decisions.    DIET: Return to your usual diet.    NOTIFY YOUR SURGEON IF: You have any bleeding that does not stop, any pus draining from your wound(s), any fever (over 100.4 F) or chills, persistent nausea/vomiting, persistent diarrhea, or if your pain is not controlled on your discharge pain medications.

## 2024-05-22 NOTE — PACU DISCHARGE NOTE - NS MD DISCHARGE NOTE DISCHARGE
"Chief Complaint   Patient presents with     Swelling     Hand swelling started yesterday. Paper cut in the afternoon, than itching and swelling started.  painful in some spots with touch.      Initial /62 mmHg  Pulse 80  Temp(Src) 97.1  F (36.2  C) (Tympanic)  Ht 5' 8\" (1.727 m)  Wt 144 lb (65.318 kg)  BMI 21.90 kg/m2 Estimated body mass index is 21.9 kg/(m^2) as calculated from the following:    Height as of this encounter: 5' 8\" (1.727 m).    Weight as of this encounter: 144 lb (65.318 kg).  BP completed using cuff size: regular - right arm.  Elke Carrillo CMA  "
Home

## 2024-05-22 NOTE — ASU DISCHARGE PLAN (ADULT/PEDIATRIC) - CARE PROVIDER_API CALL
Kamron Tavera  Plastic Surgery  [address] [facility], [address]  [place] [address]  Phone: [phone]  Fax: [phone]  Follow Up Time:

## 2024-05-29 ENCOUNTER — APPOINTMENT (OUTPATIENT)
Dept: PLASTIC SURGERY | Facility: CLINIC | Age: 29
End: 2024-05-29
Payer: COMMERCIAL

## 2024-05-29 PROCEDURE — 99024 POSTOP FOLLOW-UP VISIT: CPT

## 2024-05-29 NOTE — HISTORY OF PRESENT ILLNESS
[FreeTextEntry1] : JOHN AGUIRRE is a 28 year old patient here s/p surgery on11/8/23 with   1.  Bilateral breast augmentation  with 505 mL silicone implants, Sientra moderate profile plus 2.  Fascial graft to the lips. 3.  Revision rhinoplasty.  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  She is doing well no complaints or pain

## 2024-06-18 PROBLEM — F64.9 GENDER DYSPHORIA: Status: ACTIVE | Noted: 2022-09-22

## 2024-06-19 ENCOUNTER — APPOINTMENT (OUTPATIENT)
Dept: PLASTIC SURGERY | Facility: CLINIC | Age: 29
End: 2024-06-19
Payer: COMMERCIAL

## 2024-06-19 DIAGNOSIS — F64.9 GENDER IDENTITY DISORDER, UNSPECIFIED: ICD-10-CM

## 2024-06-19 PROCEDURE — 99024 POSTOP FOLLOW-UP VISIT: CPT

## 2024-06-19 NOTE — HISTORY OF PRESENT ILLNESS
[FreeTextEntry1] : JOHN AGUIRRE is a 28 year old patient here s/p surgery on 11/8/23 with   1.  Bilateral breast augmentation with 505 mL silicone implants, Sientra moderate profile plus 2.  Fascial graft to the lips. 3.  Revision rhinoplasty.  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  She is doing well no complaints or pain

## 2024-08-21 ENCOUNTER — APPOINTMENT (OUTPATIENT)
Dept: PLASTIC SURGERY | Facility: CLINIC | Age: 29
End: 2024-08-21
Payer: COMMERCIAL

## 2024-08-21 VITALS
BODY MASS INDEX: 30.36 KG/M2 | WEIGHT: 205 LBS | OXYGEN SATURATION: 97 % | HEIGHT: 69 IN | DIASTOLIC BLOOD PRESSURE: 74 MMHG | SYSTOLIC BLOOD PRESSURE: 135 MMHG | HEART RATE: 85 BPM

## 2024-08-21 DIAGNOSIS — Z78.9 OTHER SPECIFIED HEALTH STATUS: ICD-10-CM

## 2024-08-21 DIAGNOSIS — Z86.59 PERSONAL HISTORY OF OTHER MENTAL AND BEHAVIORAL DISORDERS: ICD-10-CM

## 2024-08-21 DIAGNOSIS — F64.9 GENDER IDENTITY DISORDER, UNSPECIFIED: ICD-10-CM

## 2024-08-21 DIAGNOSIS — F17.200 NICOTINE DEPENDENCE, UNSPECIFIED, UNCOMPLICATED: ICD-10-CM

## 2024-08-21 PROCEDURE — 99214 OFFICE O/P EST MOD 30 MIN: CPT

## 2024-08-26 NOTE — DISCUSSION/SUMMARY
[FreeTextEntry1] : This jewels patient began transitioning June 2020 She has been on hormonal therapy consistently March 2021 She has been in therapy and was diagnosed with Gender Dysphoria She is concerned about certain facial features that appear masculine She desires facial feminization surgery and is followed by a Transgender team The following facial features appear masculine and will need to be modified in order for her to achieve a more feminine appearance: -Brow -Nose -Jawline -Neck   PSxHx: Surgery: Breast augmentation Year 2023 Hospital: [facility] Surgeon: Dr. PEYTON Tavera Complications: None  Surgery: Body Contouring Year 2023 Hospital: [facility] Surgeon: Dr. Stevan King Complications: None  Surgery: FSS (Brow, Jawline, Fat to lips/cheeks) Year 2023 Hospital: [facility] Surgeon: Dr. PEYTON Tavera Complications: None   PMedHx: Depression   Medications: -Estrogen -Spironolactone 200mg QD Progesterone   FH: noncontributory   SH: occasional marijuana use,  no secondary tobacco use,   ROS: General health / Constitutional      no fever, no chills, no unusual weight changes, no energy level changes, no night sweats Skin       No color or pigmentation changes, no pruritis, no rashes, no ulcers, Hair       No changes in color, texture,  distribution, loss Nails       No color changes, brittleness, infection Head       No headaches, no new jaw pain Eyes       Good visual acuity, no color blindness, no corrective lenses, no photophobia, no diplopia, no blurred vision, no infection, pain, no medications, Ear      no tinnitus, no discharge, no pain, no medications Nose      no epistaxis, no rhinorrhea, no rhinitis, no pain, Mouth & Throat      no gingivitis, no gingival bleeding, no ulcers, no voice changes, no changes in oral mucosa or tongue Neck      no stiffness, no pain, no lymphadenitis, no thyroid disorders, Respiratory      no cough, no dyspnea, no wheezing,  no chest pain, cyanosis, no pneumonia, no asthma, Cardiovascular      no chest pain, no palpitations, no irregular rhythm, no tachycardia, no bradycardia, no heart failure, no dyspnea on exertion (PRIETO), no edema Gastrointestinal      no nausea / vomiting, no dysphagia, no reflux / GERD, no abdominal pain, no jaundice Musculoskeletal      no pain in muscles, bones, or joints; no fractures, no dislocations, no muscular weakness, no atrophy Neurological      no paresis, no paralysis, no paresthesia, no seizures, no dizziness, no syncope, no ataxia, no tremor Psychological      no childhood behavioral problems, no irritability, no sleep changes Hematological      no anemia, no bruising, no bleeding tendencies,   PHYSICAL EXAM General WDWN, in no distress,  A & O x 3 (person, place, time) HEENT Head: AT/NC (atraumatic, normocephalic), including TMJ, sensory and motor; Prominent, bossed brow and lateral orbital rim Eyes: EOMI, PERRLA Ears: exterior, nl hearing, Nose: thick skin, wide nasal dorsum, bulbous nasal tip with acute nasiolabial angle intranasal exam showed enlarged turbinates and deviated caudal septum Throat & mouth: gd palate elevation, nl tongue mobility, nl tonsil size Large lower face Prominent mandibular angle with active masseter, widened lower face Wide, boxy chin Thin lips   Neck: no masses, nl pulses, excess submental fat with neck ptosis and lack of cervical-mental angle prominent tracheal bulge ("John's Apple")   We had a 45 minute meeting with the patient discussing diagnosis and medical management issues and outcomes.  FFS: -tracheal shave -Submental fat excision and platysmaplasty (neck tightening)  She will need to provide a letter from her therapist and hormone provider    CPT 14627 Mandibular angle resection  CPT 85068: Tracheal Shave CPT 06455: Platysmaplasty, Necklift   10624 (Platysmaplasty): With prolonged testosterone exposure, the submental region will appear full and ptotic. This patient has a full and ptotic submental and neck region which is associated with a male-appearing neck. The female neck is slender and tight. The platymaplasty, performed after submental fat excision, will help create a slender and tight, female appearing neck. At times we also do a partial resection of the digastric muscle and excise the submandibular glands to create a better cervicomental angle.   93181 (Tracheal shave or tracheolaryngoplasty): A prominent "John's Apple" is a feature associated with males. Not all trans-women have a prominent "John's Apple". 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 This is seen on lateral head position and when her head is raised to the june. It causes her intense feelings of dysphoria and it is a cause for misgendering. Therefore, the patient would benefit from a tracheal shave procedure which eliminates this prominent "John's Apple" associated with male appearance.

## 2024-08-26 NOTE — DISCUSSION/SUMMARY
[FreeTextEntry1] : This jewels patient began transitioning June 2020 She has been on hormonal therapy consistently March 2021 She has been in therapy and was diagnosed with Gender Dysphoria She is concerned about certain facial features that appear masculine She desires facial feminization surgery and is followed by a Transgender team The following facial features appear masculine and will need to be modified in order for her to achieve a more feminine appearance: -Brow -Nose -Jawline -Neck   PSxHx: Surgery: Breast augmentation Year 2023 Hospital: [facility] Surgeon: Dr. PEYTON Tavera Complications: None  Surgery: Body Contouring Year 2023 Hospital: [facility] Surgeon: Dr. Stevan King Complications: None  Surgery: FSS (Brow, Jawline, Fat to lips/cheeks) Year 2023 Hospital: [facility] Surgeon: Dr. PEYTON Tavera Complications: None   PMedHx: Depression   Medications: -Estrogen -Spironolactone 200mg QD Progesterone   FH: noncontributory   SH: occasional marijuana use,  no secondary tobacco use,   ROS: General health / Constitutional      no fever, no chills, no unusual weight changes, no energy level changes, no night sweats Skin       No color or pigmentation changes, no pruritis, no rashes, no ulcers, Hair       No changes in color, texture,  distribution, loss Nails       No color changes, brittleness, infection Head       No headaches, no new jaw pain Eyes       Good visual acuity, no color blindness, no corrective lenses, no photophobia, no diplopia, no blurred vision, no infection, pain, no medications, Ear      no tinnitus, no discharge, no pain, no medications Nose      no epistaxis, no rhinorrhea, no rhinitis, no pain, Mouth & Throat      no gingivitis, no gingival bleeding, no ulcers, no voice changes, no changes in oral mucosa or tongue Neck      no stiffness, no pain, no lymphadenitis, no thyroid disorders, Respiratory      no cough, no dyspnea, no wheezing,  no chest pain, cyanosis, no pneumonia, no asthma, Cardiovascular      no chest pain, no palpitations, no irregular rhythm, no tachycardia, no bradycardia, no heart failure, no dyspnea on exertion (PRIETO), no edema Gastrointestinal      no nausea / vomiting, no dysphagia, no reflux / GERD, no abdominal pain, no jaundice Musculoskeletal      no pain in muscles, bones, or joints; no fractures, no dislocations, no muscular weakness, no atrophy Neurological      no paresis, no paralysis, no paresthesia, no seizures, no dizziness, no syncope, no ataxia, no tremor Psychological      no childhood behavioral problems, no irritability, no sleep changes Hematological      no anemia, no bruising, no bleeding tendencies,   PHYSICAL EXAM General WDWN, in no distress,  A & O x 3 (person, place, time) HEENT Head: AT/NC (atraumatic, normocephalic), including TMJ, sensory and motor; Prominent, bossed brow and lateral orbital rim Eyes: EOMI, PERRLA Ears: exterior, nl hearing, Nose: thick skin, wide nasal dorsum, bulbous nasal tip with acute nasiolabial angle intranasal exam showed enlarged turbinates and deviated caudal septum Throat & mouth: gd palate elevation, nl tongue mobility, nl tonsil size Large lower face Prominent mandibular angle with active masseter, widened lower face Wide, boxy chin Thin lips   Neck: no masses, nl pulses, excess submental fat with neck ptosis and lack of cervical-mental angle prominent tracheal bulge ("John's Apple")   We had a 45 minute meeting with the patient discussing diagnosis and medical management issues and outcomes.  FFS: -tracheal shave -Submental fat excision and platysmaplasty (neck tightening)  She will need to provide a letter from her therapist and hormone provider    CPT 26761 Mandibular angle resection  CPT 71288: Tracheal Shave CPT 51248: Platysmaplasty, Necklift   57793 (Platysmaplasty): With prolonged testosterone exposure, the submental region will appear full and ptotic. This patient has a full and ptotic submental and neck region which is associated with a male-appearing neck. The female neck is slender and tight. The platymaplasty, performed after submental fat excision, will help create a slender and tight, female appearing neck. At times we also do a partial resection of the digastric muscle and excise the submandibular glands to create a better cervicomental angle.   56482 (Tracheal shave or tracheolaryngoplasty): A prominent "John's Apple" is a feature associated with males. Not all trans-women have a prominent "John's Apple". 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 This is seen on lateral head position and when her head is raised to the june. It causes her intense feelings of dysphoria and it is a cause for misgendering. Therefore, the patient would benefit from a tracheal shave procedure which eliminates this prominent "John's Apple" associated with male appearance.

## 2024-12-01 NOTE — ASU PATIENT PROFILE, ADULT - NS PREOP UNDERSTANDS INFO
spoke to patient  to be  NPO/No solid foods after  12Mn , allow to drink water till 3 am , dress comfortable, leave  all valuable at home,  Bring ID photo and insurance cards, . escort  arranged , address and telephone given to patient/yes
No

## 2025-01-13 VITALS
DIASTOLIC BLOOD PRESSURE: 78 MMHG | TEMPERATURE: 97 F | HEART RATE: 72 BPM | OXYGEN SATURATION: 98 % | RESPIRATION RATE: 16 BRPM | WEIGHT: 182.98 LBS | HEIGHT: 69 IN | SYSTOLIC BLOOD PRESSURE: 118 MMHG

## 2025-01-13 RX ORDER — SPIRONOLACTONE 50 MG/1
1 TABLET ORAL
Refills: 0 | DISCHARGE

## 2025-01-14 ENCOUNTER — APPOINTMENT (OUTPATIENT)
Dept: PLASTIC SURGERY | Facility: HOSPITAL | Age: 30
End: 2025-01-14
Payer: COMMERCIAL

## 2025-01-14 ENCOUNTER — TRANSCRIPTION ENCOUNTER (OUTPATIENT)
Age: 30
End: 2025-01-14

## 2025-01-14 ENCOUNTER — OUTPATIENT (OUTPATIENT)
Dept: OUTPATIENT SERVICES | Facility: HOSPITAL | Age: 30
LOS: 1 days | Discharge: HOME | End: 2025-01-14
Payer: COMMERCIAL

## 2025-01-14 VITALS — HEART RATE: 66 BPM | OXYGEN SATURATION: 98 %

## 2025-01-14 DIAGNOSIS — Z98.890 OTHER SPECIFIED POSTPROCEDURAL STATES: Chronic | ICD-10-CM

## 2025-01-14 DIAGNOSIS — F64.0 TRANSSEXUALISM: ICD-10-CM

## 2025-01-14 PROCEDURE — 15876 SUCTION LIPECTOMY HEAD&NECK: CPT

## 2025-01-14 PROCEDURE — 15825 RHYTDCT NCK PLTYSML TGHTG: CPT

## 2025-01-14 PROCEDURE — 31599 UNLISTED PROCEDURE LARYNX: CPT

## 2025-01-14 PROCEDURE — 31899 UNLISTED PX TRACHEA BRONCHI: CPT

## 2025-01-14 RX ORDER — ACETAMINOPHEN 80 MG/.8ML
650 SOLUTION/ DROPS ORAL EVERY 6 HOURS
Refills: 0 | Status: DISCONTINUED | OUTPATIENT
Start: 2025-01-14 | End: 2025-01-14

## 2025-01-14 RX ORDER — ACETAMINOPHEN 80 MG/.8ML
1000 SOLUTION/ DROPS ORAL EVERY 6 HOURS
Refills: 0 | Status: COMPLETED | OUTPATIENT
Start: 2025-01-14 | End: 2025-01-14

## 2025-01-14 RX ORDER — DEXAMETHASONE SODIUM PHOSPHATE 4 MG/ML
8 VIAL (ML) INJECTION EVERY 12 HOURS
Refills: 0 | Status: DISCONTINUED | OUTPATIENT
Start: 2025-01-14 | End: 2025-01-14

## 2025-01-14 RX ORDER — ACETAMINOPHEN 80 MG/.8ML
1 SOLUTION/ DROPS ORAL
Qty: 28 | Refills: 0
Start: 2025-01-14 | End: 2025-01-20

## 2025-01-14 RX ORDER — TETRACAINE HCL 0.5 %
1 DROPS OPHTHALMIC (EYE) ONCE
Refills: 0 | Status: COMPLETED | OUTPATIENT
Start: 2025-01-14 | End: 2025-01-14

## 2025-01-14 RX ORDER — OXYCODONE HCL 15 MG
1 TABLET ORAL
Qty: 6 | Refills: 0
Start: 2025-01-14

## 2025-01-14 RX ORDER — GABAPENTIN 300 MG/1
1 CAPSULE ORAL
Qty: 10 | Refills: 0
Start: 2025-01-14 | End: 2025-01-18

## 2025-01-14 RX ORDER — DIAZEPAM 5 MG
5 TABLET ORAL ONCE
Refills: 0 | Status: DISCONTINUED | OUTPATIENT
Start: 2025-01-14 | End: 2025-01-14

## 2025-01-14 RX ORDER — ONDANSETRON 4 MG/1
4 TABLET ORAL EVERY 6 HOURS
Refills: 0 | Status: DISCONTINUED | OUTPATIENT
Start: 2025-01-14 | End: 2025-01-14

## 2025-01-14 RX ORDER — IBUPROFEN 200 MG
600 TABLET ORAL EVERY 6 HOURS
Refills: 0 | Status: DISCONTINUED | OUTPATIENT
Start: 2025-01-14 | End: 2025-01-14

## 2025-01-14 RX ORDER — OXYCODONE 5 MG/1
5 TABLET ORAL EVERY 6 HOURS
Qty: 6 | Refills: 0 | Status: ACTIVE | COMMUNITY
Start: 2025-01-14 | End: 1900-01-01

## 2025-01-14 RX ORDER — PROGESTERONE 100 MG/1
2 CAPSULE ORAL
Refills: 0 | DISCHARGE

## 2025-01-14 RX ORDER — IBUPROFEN 200 MG
1 TABLET ORAL
Qty: 28 | Refills: 0
Start: 2025-01-14 | End: 2025-01-20

## 2025-01-14 RX ORDER — BENZOCAINE AND MENTHOL 15; 3.6 MG/1; MG/1
1 LOZENGE ORAL
Refills: 0 | Status: DISCONTINUED | OUTPATIENT
Start: 2025-01-14 | End: 2025-01-14

## 2025-01-14 RX ORDER — POLYSORBATE 80 100 MG/10ML
2 SOLUTION/ DROPS OPHTHALMIC
Refills: 0 | Status: DISCONTINUED | OUTPATIENT
Start: 2025-01-14 | End: 2025-01-14

## 2025-01-14 RX ORDER — OXYCODONE HCL 15 MG
5 TABLET ORAL EVERY 4 HOURS
Refills: 0 | Status: DISCONTINUED | OUTPATIENT
Start: 2025-01-14 | End: 2025-01-14

## 2025-01-14 RX ORDER — OXYCODONE HCL 15 MG
10 TABLET ORAL EVERY 4 HOURS
Refills: 0 | Status: DISCONTINUED | OUTPATIENT
Start: 2025-01-14 | End: 2025-01-14

## 2025-01-14 RX ORDER — SENNOSIDES 8.6 MG/1
2 TABLET, FILM COATED ORAL ONCE
Refills: 0 | Status: DISCONTINUED | OUTPATIENT
Start: 2025-01-14 | End: 2025-01-14

## 2025-01-14 RX ORDER — POLYSORBATE 80 100 MG/10ML
1 SOLUTION/ DROPS OPHTHALMIC AT BEDTIME
Refills: 0 | Status: DISCONTINUED | OUTPATIENT
Start: 2025-01-14 | End: 2025-01-14

## 2025-01-14 RX ORDER — SODIUM CHLORIDE 9 MG/ML
1000 INJECTION, SOLUTION INTRAVENOUS
Refills: 0 | Status: DISCONTINUED | OUTPATIENT
Start: 2025-01-14 | End: 2025-01-14

## 2025-01-14 RX ORDER — GABAPENTIN 300 MG/1
300 CAPSULE ORAL EVERY 12 HOURS
Refills: 0 | Status: DISCONTINUED | OUTPATIENT
Start: 2025-01-14 | End: 2025-01-14

## 2025-01-14 RX ORDER — BUPROPION HYDROCHLORIDE 150 MG/1
1 TABLET, EXTENDED RELEASE ORAL
Refills: 0 | DISCHARGE

## 2025-01-14 RX ORDER — DIPHENHYDRAMINE HCL 25 MG
25 TABLET ORAL EVERY 4 HOURS
Refills: 0 | Status: DISCONTINUED | OUTPATIENT
Start: 2025-01-14 | End: 2025-01-14

## 2025-01-14 RX ADMIN — Medication 1 APPLICATION(S): at 12:12

## 2025-01-14 RX ADMIN — POLYSORBATE 80 1 APPLICATION(S): 100 SOLUTION/ DROPS OPHTHALMIC at 12:23

## 2025-01-14 RX ADMIN — Medication 1 DROP(S): at 13:35

## 2025-01-14 RX ADMIN — POLYSORBATE 80 2 DROP(S): 100 SOLUTION/ DROPS OPHTHALMIC at 12:12

## 2025-01-14 RX ADMIN — ACETAMINOPHEN 400 MILLIGRAM(S): 80 SOLUTION/ DROPS ORAL at 11:46

## 2025-01-14 RX ADMIN — ACETAMINOPHEN 1000 MILLIGRAM(S): 80 SOLUTION/ DROPS ORAL at 12:54

## 2025-01-14 NOTE — PACU DISCHARGE NOTE - NS MD DISCHARGE NOTE DISCHARGE
-Troponin minimally elevated but trended down.  -? NSTEMI vs due to undiagnosed systolic chf?  -Await stress test and echo today.  If these are normal would anticipate discharge home later today.  -Continue aspirin, statin and beta blocker  -Appreciate input from Dr. Parkinson.     Home

## 2025-01-14 NOTE — ASU DISCHARGE PLAN (ADULT/PEDIATRIC) - FINANCIAL ASSISTANCE
[facility] provides services at a reduced cost to those who are determined to be eligible through [facility]’s financial assistance program. Information regarding [facility]’s financial assistance program can be found by going to [email or web address] or by calling [phone].

## 2025-01-14 NOTE — BRIEF OPERATIVE NOTE - TYPE OF ANESTHESIA
General Received request via: Pharmacy    Was the patient seen in the last year in this department? Yes    Does the patient have an active prescription (recently filled or refills available) for medication(s) requested? No    Does the patient have [facility] Plus and need 100 day supply (blood pressure, diabetes and cholesterol meds only)? Patient does not have SCP

## 2025-01-14 NOTE — ASU DISCHARGE PLAN (ADULT/PEDIATRIC) - CARE PROVIDER_API CALL
Zachary Brennan  Plastic Surgery  [address], [address]  [place] [address]  Phone: [phone]  Fax: [phone]  Established Patient  Scheduled Appointment: 01/24/2025

## 2025-01-14 NOTE — ASU DISCHARGE PLAN (ADULT/PEDIATRIC) - ASU DC SPECIAL INSTRUCTIONSFT
Wound care:   keep bandage and jawbra on for at least 2 days after surgery.  After the 2 days you can remove jawbra to change bandage or wash face. Replace jawbra and wear day and night to help with swelling. Do not fasten too tightly  but do not get the incision site on the neck wet for 7 days (there is a special adhesive on the incision that should not get wet). Your incision does not need to be washed and does not require ointment application    Pain management:  You can  your medications at the hospital pharmacy.  Tylenol every 6 hours, alternate with Ibuprofen every 6 hours, so that a dose of pain medication is taken every 3 hours  Gabapentin one pill every 12 hours, for maximum of 5 days, for nerve pain  Oxycodone only as needed for severe pain. At least 6 hours apart and do not exceed 6 pills per day    Sleep with head elevated on at least two pillows while resting or sleeping  You can progress to a normal diet, but start on a clear diet today, then full liquid (smoothies, apple sauces), then eventually a soft food diet and back to your regular diet, as tolerated.   Please do not engage in exercise or strenuous activity; no heavy lifting and limit how often you have your head below the level of your heart.    Follow up:  Our office will call you for an appointment; we will want to see you back in the office for follow up on 1/24/25. Follow up sooner if you have severe pain, inability to swallow, severe swelling.   If you need to seek emergency care, please go directly to [facility].

## 2025-01-14 NOTE — ASU DISCHARGE PLAN (ADULT/PEDIATRIC) - NS MD DC FALL RISK RISK
For information on Fall & Injury Prevention, visit: [email or web address] OR  [email or web address] OR  https://www.cdc.gov/steadi/patient.html

## 2025-01-24 ENCOUNTER — APPOINTMENT (OUTPATIENT)
Dept: PLASTIC SURGERY | Facility: CLINIC | Age: 30
End: 2025-01-24

## 2025-01-24 DIAGNOSIS — F64.9 GENDER IDENTITY DISORDER, UNSPECIFIED: ICD-10-CM

## 2025-01-24 DIAGNOSIS — Z09 ENCOUNTER FOR FOLLOW-UP EXAMINATION AFTER COMPLETED TREATMENT FOR CONDITIONS OTHER THAN MALIGNANT NEOPLASM: ICD-10-CM

## 2025-01-24 PROCEDURE — 99024 POSTOP FOLLOW-UP VISIT: CPT

## 2025-01-27 PROBLEM — Z78.9 OTHER SPECIFIED HEALTH STATUS: Chronic | Status: ACTIVE | Noted: 2025-01-13

## 2025-02-07 ENCOUNTER — APPOINTMENT (OUTPATIENT)
Dept: PLASTIC SURGERY | Facility: CLINIC | Age: 30
End: 2025-02-07
Payer: COMMERCIAL

## 2025-02-07 VITALS
SYSTOLIC BLOOD PRESSURE: 115 MMHG | WEIGHT: 177 LBS | BODY MASS INDEX: 26.22 KG/M2 | TEMPERATURE: 98 F | DIASTOLIC BLOOD PRESSURE: 62 MMHG | HEART RATE: 67 BPM | HEIGHT: 69 IN | OXYGEN SATURATION: 97 %

## 2025-02-07 DIAGNOSIS — F64.9 GENDER IDENTITY DISORDER, UNSPECIFIED: ICD-10-CM

## 2025-02-07 DIAGNOSIS — Z09 ENCOUNTER FOR FOLLOW-UP EXAMINATION AFTER COMPLETED TREATMENT FOR CONDITIONS OTHER THAN MALIGNANT NEOPLASM: ICD-10-CM

## 2025-02-07 PROCEDURE — 99024 POSTOP FOLLOW-UP VISIT: CPT

## 2025-02-12 RX ORDER — CEPHALEXIN 500 MG/1
500 CAPSULE ORAL TWICE DAILY
Qty: 14 | Refills: 0 | Status: COMPLETED | COMMUNITY
Start: 2025-02-12 | End: 2025-02-19

## 2025-03-21 ENCOUNTER — APPOINTMENT (OUTPATIENT)
Dept: PLASTIC SURGERY | Facility: CLINIC | Age: 30
End: 2025-03-21
Payer: COMMERCIAL

## 2025-03-21 DIAGNOSIS — F64.9 GENDER IDENTITY DISORDER, UNSPECIFIED: ICD-10-CM

## 2025-03-21 DIAGNOSIS — Z09 ENCOUNTER FOR FOLLOW-UP EXAMINATION AFTER COMPLETED TREATMENT FOR CONDITIONS OTHER THAN MALIGNANT NEOPLASM: ICD-10-CM

## 2025-03-21 PROCEDURE — 99024 POSTOP FOLLOW-UP VISIT: CPT

## 2025-04-11 ENCOUNTER — APPOINTMENT (OUTPATIENT)
Dept: PLASTIC SURGERY | Facility: CLINIC | Age: 30
End: 2025-04-11

## (undated) DEVICE — NDL HYPO SAFE 25G X 1.5" (ORANGE)

## (undated) DEVICE — BLADE SURGICAL #11 CARBON

## (undated) DEVICE — SAW BLADE OSTEOMED VRO 12MM

## (undated) DEVICE — NDL SPINAL 22G X 3.5" (BLACK)

## (undated) DEVICE — DRSG AQUAPLAST BLANK BLUSH 3 X 3"

## (undated) DEVICE — SUT VICRYL 2-0 27" CT-1 UNDYED

## (undated) DEVICE — SUT VICRYL 6-0 18" PC-1 UNDYED

## (undated) DEVICE — SYR LUER LOK 3CC

## (undated) DEVICE — SAW BLADE STRYKER RECIPROCATING 27MMX0.38MM

## (undated) DEVICE — SUCTION YANKAUER BULBOUS TIP NO VENT

## (undated) DEVICE — SUT PLAIN GUT FAST ABSORBING 5-0 PC-1

## (undated) DEVICE — PACK BREAST

## (undated) DEVICE — FOLEY TRAY 16FR 5CC LF UMETER CLOSED

## (undated) DEVICE — SUT PLAIN GUT 4-0 18" SC-1

## (undated) DEVICE — ELCTR COLORADO 3CM

## (undated) DEVICE — NDL HYPO REGULAR BEVEL 25G X 1.5" (BLUE)

## (undated) DEVICE — AESCULAP SCALPFIX 10 CLIPS

## (undated) DEVICE — GLV 7.5 PROTEXIS (WHITE)

## (undated) DEVICE — Device

## (undated) DEVICE — PREP CHLORAPREP HI-LITE ORANGE 26ML

## (undated) DEVICE — DRSG TUBE SPANDAGE 8 10YD

## (undated) DEVICE — BLADE SURGICAL #15 CARBON

## (undated) DEVICE — S&N ARTHROCARE ENT WAND REFLEX ULTRA 45

## (undated) DEVICE — DRSG COMBINE 5X9"

## (undated) DEVICE — PACK RHINOPLASTY

## (undated) DEVICE — ONETRAC LIGHTED RETRACTOR 135 X 30MM DISP

## (undated) DEVICE — DRAPE INSTRUMENT POUCH 6.75" X 11"

## (undated) DEVICE — SUT VICRYL 2-0 27" SH UNDYED

## (undated) DEVICE — DRSG STERISTRIPS 0.5 X 4"

## (undated) DEVICE — DRSG XEROFORM 1 X 8"

## (undated) DEVICE — VENODYNE/SCD SLEEVE CALF MEDIUM

## (undated) DEVICE — APPLICATOR Q TIP 6" WOOD STEM

## (undated) DEVICE — SUT MONOCRYL 4-0 18" P-3 UNDYED

## (undated) DEVICE — MODEL IPS TRANSFORM

## (undated) DEVICE — WARMING BLANKET LOWER ADULT

## (undated) DEVICE — DRSG GAUZE MOISTURIZER 0.5 OZ 4X8

## (undated) DEVICE — VAGINAL PACKING 2"

## (undated) DEVICE — DRAPE MAGNETIC INSTRUMENT MEDIUM

## (undated) DEVICE — RUBBERBAND STRL LTX FR 200/CA 3X1/8IN

## (undated) DEVICE — SYR LUER LOK 5CC

## (undated) DEVICE — BLADE SURGICAL #10 CARBON

## (undated) DEVICE — SYR LUER LOK 30CC

## (undated) DEVICE — TUBING MICROAIRE ASPIRATION SET 12FT

## (undated) DEVICE — SUT CHROMIC 4-0 27" RB-1

## (undated) DEVICE — DRSG GAUZE SPONGE 2X2" STERILE

## (undated) DEVICE — STAPLER SKIN VISI-STAT 35 WIDE

## (undated) DEVICE — DRSG MASTISOL

## (undated) DEVICE — DRSG XEROFORM 5 X 9"

## (undated) DEVICE — KLS MARTIN MODEL CUSTOM HALF IPS

## (undated) DEVICE — PROTECTOR CORNEAL BLUE ADULT

## (undated) DEVICE — DRAIN JACKSON PRATT 7MM FLAT FULL NO TROCAR

## (undated) DEVICE — BUR STRYKER CARBIDE CROSS CUT FISSURE 1.2MM

## (undated) DEVICE — DRAPE TOWEL BLUE 17" X 24"

## (undated) DEVICE — SUT PROLENE 6-0 18" P-3

## (undated) DEVICE — PACK BREAST RECONSTRUCTION

## (undated) DEVICE — COMPRESSION CHIN-NECK SMALL

## (undated) DEVICE — GLV 7 PROTEXIS (WHITE)

## (undated) DEVICE — SUT PLAIN GUT 4-0 18" P-3

## (undated) DEVICE — SYR LUER LOK 20CC

## (undated) DEVICE — PREP BETADINE SPONGE STICKS

## (undated) DEVICE — DRSG KERLIX ROLL 4.5"

## (undated) DEVICE — PETRI DISH MED 3.5"

## (undated) DEVICE — DRAIN JACKSON PRATT CHANNEL HUBLESS 10FR W TROCAR

## (undated) DEVICE — SUT SILK 2-0 30" PSL

## (undated) DEVICE — DRAIN BLAKE 10FR ROUND

## (undated) DEVICE — MARKING PEN W RULER

## (undated) DEVICE — POSITIONER FOAM EGG CRATE ULNAR 2PCS (PINK)

## (undated) DEVICE — SUT ETHILON 3-0 18" PS-1

## (undated) DEVICE — DRSG CURITY GAUZE SPONGE 4 X 4" 12-PLY

## (undated) DEVICE — SUT NYLON 11-0 4" DRM4

## (undated) DEVICE — DRAIN RESERVOIR FOR JACKSON PRATT 100CC CARDINAL

## (undated) DEVICE — SYR LUER LOK 10CC

## (undated) DEVICE — BUR STRYKER EGG 4MM

## (undated) DEVICE — ELCTR BOVIE TIP BLADE INSULATED 2.75" EDGE

## (undated) DEVICE — NDL 18G BLUNT FILL PINK

## (undated) DEVICE — CATH ANGIOCATH 12G

## (undated) DEVICE — SYR LUER LOK 1CC

## (undated) DEVICE — DRAPE IRRIGATION POUCH 19X23"

## (undated) DEVICE — DRSG TELFA 3 X 8

## (undated) DEVICE — SUT MONOCRYL 3-0 18" PS-2 UNDYED

## (undated) DEVICE — BIPOLAR FORCEP KIRWAN JEWELERS STR 4" X 0.4MM W 12FT CORD (GREEN)

## (undated) DEVICE — MODEL CRANIUM CRYSTAL IPS

## (undated) DEVICE — TAPE SILK 3"

## (undated) DEVICE — SUCTION YANKAUER VITAL VUE

## (undated) DEVICE — KELLER FUNNEL

## (undated) DEVICE — PACK UPPER BODY

## (undated) DEVICE — DRSG TEGADERM 4X4.75

## (undated) DEVICE — SLV COMPRESSION KNEE MED

## (undated) DEVICE — ABDOMINAL BINDER MED/LG 12" X 45"-62"

## (undated) DEVICE — SYR LUER LOK 50CC

## (undated) DEVICE — WARMING BLANKET FULL UNDERBODY

## (undated) DEVICE — SUT SILK 2-0 18" FS

## (undated) DEVICE — LAP PAD 4 X 18"

## (undated) DEVICE — BAG SPONGE COUNTER EZ